# Patient Record
(demographics unavailable — no encounter records)

---

## 2018-09-19 NOTE — PRG
DATE OF SERVICE:  09/19/2018

 

HISTORY:  Mrs. Loretta Posey is a very pleasant 78-year-old previously seen in the [facility] for
 a venous ulceration of the right medial lower leg.  The patient now presents to the [facility] for
 evaluation of an ulceration of the plantar surface of the right second toe.  The ulceration of the r
ight medial lower leg has healed completely and has remained healed according to the patient's husban
d.  The ulceration of the plantar surface of the right second toe has been present for approximately 
1 month.  The patient's  states that Mrs. Posey was seen by Dr. Vasques and a course of p.o.
 antibiotics was prescribed which Mrs. Posey has completed as prescribed.  The patient has been rec
eiving the application of vitamin E oil to the ulceration with or without a Band-Aid as a secondary d
ressing.  Most recently, the patient's  states.  Mrs. Posey has not been receiving dressing 
changes with the use of a Band-Aid over the vitamin E oil.

 

PAST MEDICAL HISTORY:

1.  Hypertension.

2.  Diabetes mellitus.

3.  Arthritis.

4.  Osteopenia.

5.  Coronary artery disease.

6.  Gastroesophageal reflux disease.

7.  Dementia.

 

PAST SURGICAL HISTORY:

1.  Appendectomy.

2.  Tubal ligation.

3.  D&C.

4.  Right total knee arthroplasty.  09/15/2014.

 

MEDICATIONS:  The patient does not have a list of her medications with her today.  She states that he
r medications; however, include a multivitamin, and vitamin B12.

 

PHYSICAL EXAMINATION:

VITAL SIGNS:  Temperature 97.7, pulse 73, respirations 21, blood pressure 149/83.  Accu-Chek 237.

EXTREMITIES:  An ulceration of the plantar surface of the right second toe is present, which measures
 approximately 0.6 x 0.6 cm.  Granulation tissue is present within the wound margins.  Necrotic and n
onviable tissue present within the wound margins was debrided with an excisional full-thickness debri
alberta with the use of a curette.  Desiccated tissue at the periphery of the wound was excised with t
he use of scissors.  No purulent drainage is associated with the granulating wound.  No erythema of t
he skin surrounding the wound is present.  No maceration of the skin of the periwound is noted.  A do
rsalis pedis pulse is palpable on the right.  Edema of the right second toe is present on exam today.


 

ASSESSMENT AND PLAN:

1.  Ulceration of plantar surface of right second toe.  Plain films of the right foot will be obtaine
d to look for findings suggestive of osteomyelitis.  The patient is also to receive dressing changes 
of Medihoney and bordered gauze on a daily basis or alternatively every other day after cleansing and
 irrigation with the assistance of her .  Arrangements will be made for the home delivery of d
ressing supplies.  I have explained to the patient and her  if plain films show no evidence of
 osteomyelitis, consideration will need to be given to MRI with and without contrast to look for find
ings suggestive of osteomyelitis.  The patient and her  understand and are in agreement with t
he preceding treatment plan.

2.  Hypertension.

3.  Diabetes mellitus.  The patient's Accu-Chek in clinic today is 237.  The patient and her  
have been told that for optimal wound healing, the patient's blood glucoses should remain below 150.

4.  Arthritis.

5.  Osteopenia.

6.  Coronary artery disease.

7.  Gastroesophageal reflux.

8.  Dementia.

## 2018-09-19 NOTE — RAD
RIGHT SECOND TOE THREE VIEWS:

 

HISTORY:

A 78-year-old female with a history of a chronic wound.  Clinical concern for osteomyelitis.

 

FINDINGS:

There is some soft tissue swelling of the second toe.  No overt bony erosive or destructive changes. 
 Deformity of the third metatarsal distally, evidence for a healed fracture.

 

IMPRESSION:

Soft tissue swelling of the second toe without fracture or dislocation or evidence for acute bony ero
sive or destructive changes.  If there remains clinical concern for osteomyelitis, a follow-up MRI st
udy is suggested.

 

POS: MANDI

## 2018-10-04 NOTE — MRI
MRI OF THE RIGHT FOREFOOT WITH AND WITHOUT IV CONTRAST:

 

PROVIDED CLINICAL HISTORY: 

Chronic right 2nd toe ulceration.

 

FINDINGS: 

There is diminished T1 signal intensity and increased signal intensity on the fluid-sensitive sequenc
es involving the 2nd digit distal phalanx.  This demonstrates corresponding contrast enhancement.  Th
ere is enhancement of the surrounding soft tissues.  There is no evidence for focal fluid collection 
to suggest abscess.  There is no significant tenosynovial fluid apparent.  No joint effusion is evide
nt.  Regional marrow signal appears otherwise normal.  Alignment appears anatomic.  Nonspecific fluid
 signal intensity at the dorsum of the foot without associated contrast enhancement presumably reflec
ting lymphedema.  The dorsal extensor and plantar flexor tendons demonstrate an intact MR appearance.


 

IMPRESSION: 

Findings compatible with the provided clinical history of 2nd digit wound, with associated osteomyeli
tis involving the 2nd digit distal phalanx.

 

POS: DONATO

## 2018-10-30 NOTE — HP
HISTORY OF PRESENT ILLNESS:  Loretta Posey is a 78-year-old female, ambulatory with a walker, followed
 by Dr. Vasques, has seen by Dr. Elias in [facility].  She has a right second toe wound 
which on plain x-rays demonstrated suggestion of osteomyelitis and subsequent MRI scan at [facility] on 10/04/2018 revealed right second toe distal phalanx osteomyelitis.  She has a chronic wou
nd and mild redness.  She has palpable pedal pulses.  She has changes of chronic venous stasis.  She 
has been referred for consideration of amputation.  Plan is to perform this as an outpatient under in
travenous sedation and regional anesthesia, such as ankle block provided by Anesthesia.

 

ALLERGIES:  PENICILLIN; SULFA; ZOLOFT, diarrhea and neck spasm; ZANTAC, rash; NEOSPORIN, rash; PENICI
LLIN, rash; SULFA, rash.

 

TOBACCO:  None.

 

ALCOHOL:  None.

 

MEDICATIONS:  List obtained from Dr. Vasques's records reveal fluoxetine 20 mg a day, amlodipine besy
late 20 mg a day, Lantus insulin 2 units at bedtime, Humalog lispro 6 units q.i.d. as needed, pioglit
azone 30 mg daily, donepezil 10 mg at bedtime, gabapentin 300 mg b.i.d., Osteo Bi-Flex vitamins, Penl
ac solution as needed.

 

PAST SURGICAL HISTORY:  Right total knee replacement, right total knee replacement in 2013, cataract 
surgery, tubal ligation in 1984.

 

PAST MEDICAL HISTORY:  Chronic venous stasis disease, diabetes mellitus, hypertension, osteomyelitis 
of right second toe, sciatica.

 

REVIEW OF SYSTEMS:  Noncontributory.

 

FAMILY HISTORY:  Noncontributory.

 

PHYSICAL EXAMINATION:

VITAL SIGNS:  151/50, 73, 97.9 degrees, 170 pounds, 63 inches.

HEENT:  Unremarkable.

LUNGS:  Clear to auscultation.

CARDIAC:  Regular rate and rhythm without murmur or gallop.

ABDOMEN:  Soft, obese, nontender.

EXTREMITIES:  Palpable femoral, popliteal, dorsalis pedis, posterior tibial pulses.  Right second toe
 reveals mild edema with ulceration at the tip.  There are some changes over the dorsum of the foot b
eneath the right second toe indicative of chronic inflammation resolved.  There are chronic venous st
asis changes in foot and leg.  There are no venous stasis ulcers.

 

ASSESSMENT AND PLAN:  Right second toe osteomyelitis, distal phalanx.  Would recommend amputation of 
right second toe.  Regional anesthesia ankle block provided by Anesthesia with intravenous sedation o
n outpatient and discharged home with a postoperative shoe.  Weightbear as tolerated.  She will remov
e the dressings in 48 hours and begin washing the foot daily with soap and water, placing antibiotic 
ointment and Band-Aid on it daily.  She will report to my office in 2 weeks for suture removal.

## 2018-10-31 NOTE — EKG
Test Reason : PREOP

Blood Pressure : ***/*** mmHG

Vent. Rate : 068 BPM     Atrial Rate : 068 BPM

   P-R Int : 170 ms          QRS Dur : 080 ms

    QT Int : 400 ms       P-R-T Axes : 049 -26 037 degrees

   QTc Int : 425 ms

 

Normal sinus rhythm

Normal ECG

No previous ECGs available

Confirmed by UNIQUE ROQUE ([id number]) on 10/31/2018 3:51:20 PM

 

Referred By:  GABY           Confirmed By:UNIQUE ROQUE

## 2018-10-31 NOTE — OP
DATE OF SURGERY:  10/31/2018

 

PREOPERATIVE DIAGNOSIS:  Right second toe osteomyelitis, distal phalanx; diabetic neuropathic ulcer, 
tip of the toe; palpable pulses.  No peripheral arterial disease.

 

SURGEON:  Bang Dejesus M.D.

 

ANESTHESIA:  TIVA ankle block.

 

PROCEDURE:  The patient was taken to the operating room where under right ankle block intravenous sed
ation, right lower extremity was prepped with ChloraPrep, draped in routine fashion.  Amputation of r
ight second toe performed with a fish mouth incision, amputated to the proximal phalanx, resected pro
ximally with rongeurs, debriding connective tissue.  Excellent bleeding noted from the digital arteri
es, cauterized and subcutaneously irrigated and subcutaneous tissue closed proximally with 4-0 Monocr
yl, skin with interrupted vertical mattress sutures of 4-0 Prolene.  Antibiotic ointment and sterile 
dressings applied.